# Patient Record
Sex: MALE | Race: WHITE | Employment: FULL TIME | URBAN - METROPOLITAN AREA
[De-identification: names, ages, dates, MRNs, and addresses within clinical notes are randomized per-mention and may not be internally consistent; named-entity substitution may affect disease eponyms.]

---

## 2018-02-15 ENCOUNTER — HOSPITAL ENCOUNTER (EMERGENCY)
Age: 28
Discharge: HOME | End: 2018-02-15
Attending: EMERGENCY MEDICINE
Payer: COMMERCIAL

## 2018-02-15 VITALS
HEIGHT: 68 IN | HEART RATE: 84 BPM | WEIGHT: 162.92 LBS | RESPIRATION RATE: 15 BRPM | TEMPERATURE: 98 F | OXYGEN SATURATION: 97 % | DIASTOLIC BLOOD PRESSURE: 75 MMHG | SYSTOLIC BLOOD PRESSURE: 136 MMHG | BODY MASS INDEX: 24.69 KG/M2

## 2018-02-15 DIAGNOSIS — M79.675 GREAT TOE PAIN, LEFT: Primary | ICD-10-CM

## 2018-02-15 PROCEDURE — 99282 EMERGENCY DEPT VISIT SF MDM: CPT

## 2018-02-15 RX ORDER — PAROXETINE 10 MG/1
10 TABLET, FILM COATED ORAL DAILY
COMMUNITY

## 2018-02-15 RX ORDER — CLOMIPHENE CITRATE 50 MG/1
25 TABLET ORAL DAILY
COMMUNITY

## 2018-02-15 RX ORDER — CEPHALEXIN 500 MG/1
500 CAPSULE ORAL 3 TIMES DAILY
Qty: 21 CAP | Refills: 0 | Status: SHIPPED | OUTPATIENT
Start: 2018-02-15 | End: 2018-02-22

## 2018-02-15 RX ORDER — LEVOTHYROXINE SODIUM 50 UG/1
50 TABLET ORAL
COMMUNITY

## 2018-02-15 NOTE — LETTER
[facility] EMERGENCY DEPT 
[address] [facility] [place] [address] [id number] 
[phone] Work/School Note Date: 2/15/2018 To Whom It May concern: 
 
Luz Maria Saleem was seen and treated today in the emergency room by the following provider(s): 
Attending Provider: Kayla Allen MD.   
 
Luz Maria Saleem may return to work on 2/19/18. Sincerely, Kayla Allen MD

## 2018-02-16 NOTE — ED PROVIDER NOTES
HPI Comments: Hx hypothyroidism; presents with left great toe pain; states he suspects he has an ingrown toenail; he used toenail clippers and then a sharp razor blade to remove part of the nail yesterday; today he has had increasing pain; some of the surrounding tissue has become paler; he has noted clear discharge; pain is moderate and worse with walking; no fever or other complaints. Patient is a 33 y.o. male presenting with toe pain. Toe Pain           Past Medical History:   Diagnosis Date    Cancer [facility])        Past Surgical History:   Procedure Laterality Date    HX HEENT      NEUROLOGICAL PROCEDURE UNLISTED           History reviewed. No pertinent family history. Social History     Social History    Marital status: SINGLE     Spouse name: N/A    Number of children: N/A    Years of education: N/A     Occupational History    Not on file. Social History Main Topics    Smoking status: Never Smoker    Smokeless tobacco: Never Used    Alcohol use Yes      Comment: Once per month    Drug use: No    Sexual activity: Yes     Partners: Female     Other Topics Concern    Not on file     Social History Narrative    No narrative on file         ALLERGIES: Acyclovir-hydrocortisone    Review of Systems   All other systems reviewed and are negative. Vitals:    02/15/18 2201   BP: 136/75   Pulse: 84   Resp: 15   Temp: 98 °F (36.7 °C)   SpO2: 97%   Weight: 73.9 kg (162 lb 14.7 oz)   Height: 5' 8\" (1.727 m)            Physical Exam   Constitutional: He appears well-developed and well-nourished. HENT:   Head: Normocephalic and atraumatic. Eyes: Conjunctivae are normal. No scleral icterus. Neck: No JVD present. No tracheal deviation present. Cardiovascular: Normal rate. Pulmonary/Chest: Effort normal.   Abdominal: He exhibits no distension. Musculoskeletal: He exhibits no edema. Neurological: He is alert. oriented   Skin: No rash noted. No pallor.    Left great toe - a lot of the toenail has been removed; there is no overt signs of infection; the tissue adjacent to the distal medial side of the nail is pale; there is mild surrounding tenderness. Psychiatric: He has a normal mood and affect. MDM      ED Course       Procedures    A/P: left great toe pain - likely from him removing a good portion of the nail yesterday in an attempt to fix an ingrown toenail; no over signs of infection, but he is certainly at risk for developing infection; Keflex; podiatry f/u.   Blanca Vickers MD  10:19 PM

## 2018-02-16 NOTE — ED TRIAGE NOTES
Pt reports pain, pallor, and small amounts of discharge from left great toe since yesterday. Pt reports having an ingrown toenail that he tried to clip yesterday. Pt reports pain from toe for the past few weeks.

## 2018-02-16 NOTE — ED NOTES
The patient was discharged home by Dr Amanda Reid in stable condition. The patient is alert and oriented, in no respiratory distress. The patient's diagnosis, condition and treatment were explained. The patient expressed understanding. A discharge plan has been developed. A  was not involved in the process. Aftercare instructions were given. Pt ambulatory out of the ED.

## 2018-02-16 NOTE — DISCHARGE INSTRUCTIONS
Cellulitis: Care Instructions  Your Care Instructions    Cellulitis is a skin infection. It often occurs after a break in the skin from a scrape, cut, bite, or puncture, or after a rash. The doctor has checked you carefully, but problems can develop later. If you notice any problems or new symptoms, get medical treatment right away. Follow-up care is a key part of your treatment and safety. Be sure to make and go to all appointments, and call your doctor if you are having problems. It's also a good idea to know your test results and keep a list of the medicines you take. How can you care for yourself at home? · Take your antibiotics as directed. Do not stop taking them just because you feel better. You need to take the full course of antibiotics. · Prop up the infected area on pillows to reduce pain and swelling. Try to keep the area above the level of your heart as often as you can. · If your doctor told you how to care for your wound, follow your doctor's instructions. If you did not get instructions, follow this general advice:  ¨ Wash the wound with clean water 2 times a day. Don't use hydrogen peroxide or alcohol, which can slow healing. ¨ You may cover the wound with a thin layer of petroleum jelly, such as Vaseline, and a nonstick bandage. ¨ Apply more petroleum jelly and replace the bandage as needed. · Be safe with medicines. Take pain medicines exactly as directed. ¨ If the doctor gave you a prescription medicine for pain, take it as prescribed. ¨ If you are not taking a prescription pain medicine, ask your doctor if you can take an over-the-counter medicine. To prevent cellulitis in the future  · Try to prevent cuts, scrapes, or other injuries to your skin. Cellulitis most often occurs where there is a break in the skin. · If you get a scrape, cut, mild burn, or bite, wash the wound with clean water as soon as you can to help avoid infection.  Don't use hydrogen peroxide or alcohol, which can slow healing. · If you have swelling in your legs (edema), support stockings and good skin care may help prevent leg sores and cellulitis. · Take care of your feet, especially if you have diabetes or other conditions that increase the risk of infection. Wear shoes and socks. Do not go barefoot. If you have athlete's foot or other skin problems on your feet, talk to your doctor about how to treat them. When should you call for help? Call your doctor now or seek immediate medical care if:  ? · You have signs that your infection is getting worse, such as:  ¨ Increased pain, swelling, warmth, or redness. ¨ Red streaks leading from the area. ¨ Pus draining from the area. ¨ A fever. ? · You get a rash. ? Watch closely for changes in your health, and be sure to contact your doctor if:  ? · You are not getting better after 1 day (24 hours). ? · You do not get better as expected. Where can you learn more? Go to http://steve-aziza.info/. Madelon [place] in the search box to learn more about \"Cellulitis: Care Instructions. \"  Current as of: October 13, 2016  Content Version: 11.4  © 1256-0726 [employer]. Care instructions adapted under license by [employer] (which disclaims liability or warranty for this information). If you have questions about a medical condition or this instruction, always ask your healthcare professional. [place] [address] any warranty or liability for your use of this information.